# Patient Record
Sex: FEMALE
[De-identification: names, ages, dates, MRNs, and addresses within clinical notes are randomized per-mention and may not be internally consistent; named-entity substitution may affect disease eponyms.]

---

## 2020-01-14 ENCOUNTER — HOSPITAL ENCOUNTER (EMERGENCY)
Dept: HOSPITAL 97 - ER | Age: 65
Discharge: HOME | End: 2020-01-14
Payer: MEDICARE

## 2020-01-14 VITALS — DIASTOLIC BLOOD PRESSURE: 88 MMHG | OXYGEN SATURATION: 96 % | SYSTOLIC BLOOD PRESSURE: 130 MMHG

## 2020-01-14 VITALS — TEMPERATURE: 98 F

## 2020-01-14 DIAGNOSIS — E11.9: ICD-10-CM

## 2020-01-14 DIAGNOSIS — J44.9: ICD-10-CM

## 2020-01-14 DIAGNOSIS — L03.116: Primary | ICD-10-CM

## 2020-01-14 LAB
ALBUMIN SERPL BCP-MCNC: 3.1 G/DL (ref 3.4–5)
ALP SERPL-CCNC: 89 U/L (ref 45–117)
ALT SERPL W P-5'-P-CCNC: 17 U/L (ref 12–78)
AST SERPL W P-5'-P-CCNC: 13 U/L (ref 15–37)
BUN BLD-MCNC: 13 MG/DL (ref 7–18)
GLUCOSE SERPLBLD-MCNC: 156 MG/DL (ref 74–106)
HCT VFR BLD CALC: 39.1 % (ref 36–45)
LYMPHOCYTES # SPEC AUTO: 2.6 K/UL (ref 0.7–4.9)
PMV BLD: 8.6 FL (ref 7.6–11.3)
POTASSIUM SERPL-SCNC: 3.6 MMOL/L (ref 3.5–5.1)
RBC # BLD: 4.38 M/UL (ref 3.86–4.86)

## 2020-01-14 PROCEDURE — 36415 COLL VENOUS BLD VENIPUNCTURE: CPT

## 2020-01-14 PROCEDURE — 99284 EMERGENCY DEPT VISIT MOD MDM: CPT

## 2020-01-14 PROCEDURE — 96365 THER/PROPH/DIAG IV INF INIT: CPT

## 2020-01-14 PROCEDURE — 96366 THER/PROPH/DIAG IV INF ADDON: CPT

## 2020-01-14 PROCEDURE — 80076 HEPATIC FUNCTION PANEL: CPT

## 2020-01-14 PROCEDURE — 85025 COMPLETE CBC W/AUTO DIFF WBC: CPT

## 2020-01-14 PROCEDURE — 96375 TX/PRO/DX INJ NEW DRUG ADDON: CPT

## 2020-01-14 PROCEDURE — 80048 BASIC METABOLIC PNL TOTAL CA: CPT

## 2020-01-14 NOTE — EDPHYS
Physician Documentation                                                                           

 [facility]                                                                 

Name: Cherelle Chang                                                                                

Age: 64 yrs                                                                                       

Sex: Female                                                                                       

: 1955                                                                                   

MRN: [id number]                                                                                   

Arrival Date: 2020                                                                          

Time: 18:37                                                                                       

Account#: [id number]                                                                            

Bed 26                                                                                            

Private MD: None, None                                                                            

ED Physician Yaw Kingsley                                                                     

HPI:                                                                                              

                                                                                             

19:57 This 64 yrs old  Female presents to ER via Wheelchair with complaints of Leg    tw4 

      Pain.                                                                                       

19:57 The patient presents with pain, that is acute. The complaints affect the left hip, left tw4 

      upper thigh and left quadriceps. Context: The problem was sustained at home. Onset: The     

      symptoms/episode began/occurred 4 day(s) ago. Modifying factors: The symptoms are           

      alleviated by remaining still, the symptoms are aggravated by movement, weight bearing,     

      bending knee. Associated signs and symptoms: The patient has no apparent associated         

      signs or symptoms. Severity of symptoms: At their worst the symptoms were moderate, in      

      the emergency department the symptoms are unchanged. The patient has experienced a          

      previous episode.                                                                           

                                                                                                  

Historical:                                                                                       

- Allergies:                                                                                      

18:55 NKA;                                                                                    iw  

- Home Meds:                                                                                      

20:25 Albuterol Inhl [Active]; Atrovent Inhl [Active]; levofloxacin 750 mg Oral tab 1 tab     mg2 

      once daily [Active]; metformin 500 mg Oral Tb24 1 tab once daily [Active]; prednisone       

      20 mg Oral tab once daily [Active];                                                         

- PMHx:                                                                                           

18:55 COPD; Diabetes - NIDDM;                                                                 iw  

- PSHx:                                                                                           

18:55 Hysterectomy; ;                                                                iw  

                                                                                                  

- Immunization history:: Flu vaccine status is unknown.                                           

- Ebola Screening: : Patient negative for fever greater than or equal to 101.5 degrees            

  Fahrenheit, and additional compatible Ebola Virus Disease symptoms Patient denies               

  exposure to infectious person Patient denies travel to an Ebola-affected area in the            

  21 days before illness onset No symptoms or risks identified at this time.                      

- Social history:: Smoking status: Patient/guardian denies using tobacco.                         

                                                                                                  

                                                                                                  

ROS:                                                                                              

19:57 Eyes: Negative for injury, pain, redness, and discharge, ENT: Negative for injury,      tw4 

      pain, and discharge, Cardiovascular: Negative for chest pain, palpitations, and edema,      

      Respiratory: Negative for shortness of breath, cough, wheezing, and pleuritic chest         

      pain, Abdomen/GI: Negative for abdominal pain, nausea, vomiting, diarrhea, and              

      constipation, Back: Negative for injury and pain, Skin: Negative for injury, rash, and      

      discoloration, Neuro: Negative for headache, weakness, numbness, tingling, and seizure.     

19:57 MS/extremity: Positive for decreased range of motion, erythema, pain, swelling,             

      tenderness, tingling, Negative for injury or acute deformity, abrasion, bite,               

      contusion, deformity, ecchymosis, laceration, puncture.                                     

                                                                                                  

Exam:                                                                                             

19:57 Constitutional:  This is a well developed, well nourished patient who is awake, alert,  tw4 

      and in no acute distress. Head/Face:  Normocephalic, atraumatic. Chest/axilla:  Normal      

      chest wall appearance and motion.  Nontender with no deformity.  No lesions are             

      appreciated. Cardiovascular:  Regular rate and rhythm with a normal S1 and S2.  No          

      gallops, murmurs, or rubs.  Normal PMI, no JVD.  No pulse deficits. Respiratory:  Lungs     

      have equal breath sounds bilaterally, clear to auscultation and percussion.  No rales,      

      rhonchi or wheezes noted.  No increased work of breathing, no retractions or nasal          

      flaring. Abdomen/GI:  Soft, non-tender, with normal bowel sounds.  No distension or         

      tympany.  No guarding or rebound.  No evidence of tenderness throughout. Back:  No          

      spinal tenderness.  No costovertebral tenderness.  Full range of motion. Skin:  Warm,       

      dry with normal turgor.  Normal color with no rashes, no lesions, and no evidence of        

      cellulitis.                                                                                 

19:57 Musculoskeletal/extremity: Extremities: noted in the left hip, left upper thigh and         

      left quadriceps: decreased ROM, erythema, pain, swelling, tenderness.                       

                                                                                                  

Vital Signs:                                                                                      

19:00  / 76; Pulse 102; Resp 18; Temp 98; Pulse Ox 100% on R/A;                         mg2 

22:14  / 88; Pulse 88; Resp 18; Temp 98; Pulse Ox 96% on R/A;                           mg2 

                                                                                                  

MDM:                                                                                              

19:14 Patient medically screened.                                                                                                                                                          

19:37 Order name: Basic Metabolic Panel; Complete Time: 22:16                                                                                                                              

22:16 Interpretation: Normal except: GFR 85; GLUC 156; .                                                                                                                             

19:37 Order name: CBC with Diff; Complete Time: 22:16                                                                                                                                      

22:16 Interpretation: Normal except: WBC 11.3.                                                                                                                                             

19:37 Order name: Creatinine for Radiology; Complete Time: 22:16                              4 

                                                                                             

22:16 Interpretation: Within normal limits: CRE 0.71.                                                                                                                                      

19:37 Order name: Hepatic Function; Complete Time: 22:16                                      4 

                                                                                             

22:16 Interpretation: Normal except: AST 13; ALB 3.1; A/G 0.9.                                                                                                                             

19:37 Order name: IV Saline Lock; Complete Time: 19:55                                        4 

                                                                                             

19:37 Order name: Labs collected and sent; Complete Time: 19:55                               tw4 

                                                                                                  

Administered Medications:                                                                         

19:52 Drug: TORadol 30 mg Route: IVP; Site: right forearm;                                    mg2 

22:18 Follow up: Response: No adverse reaction; Marked relief of symptoms                     mg2 

19:55 Drug: Cleocin 300 mg Route: IVPB; Infused Over: 30 mins; Site: right forearm;           mg2 

22:18 Follow up: Response: No adverse reaction; IV Status: Completed infusion                 mg2 

                                                                                                  

                                                                                                  

Disposition:                                                                                      

20 22:17 Discharged to Home. Impression: Cellulitis of left lower limb.                     

- Condition is Stable.                                                                            

- Discharge Instructions: Cellulitis, Adult.                                                      

- Prescriptions for Cleocin 300 mg Oral Capsule - take 1 capsule by ORAL route every 6            

  hours for 10 days; 40 capsule. Ibuprofen 800 mg Oral Tablet - take 1 tablet by ORAL             

  route every 8 hours As needed take with food; 30 tablet.                                        

- Medication Reconciliation Form, Thank You Letter, Antibiotic Education, Prescription            

  Opioid Use form.                                                                                

- Follow up: Private Physician; When: Upon discharge from the Emergency Department;               

  Reason: Recheck today's complaints, Continuance of care.                                        

- Problem is new.                                                                                 

- Symptoms have improved.                                                                         

                                                                                                  

                                                                                                  

                                                                                                  

Signatures:                                                                                       

Dispatcher [employer]                           EDKarlie Cutler RN                     RN   iw                                                   

Yaw Kingsley MD MD   tw4                                                  

Josiah Finn RN                    RN   mg2                                                  

                                                                                                  

Corrections: (The following items were deleted from the chart)                                    

22:26 22:17 2020 22:17 Discharged to Home. Impression: Cellulitis of left lower limb.   mg2 

      Condition is Stable. Forms are Medication Reconciliation Form, Thank You Letter,            

      Antibiotic Education, Prescription Opioid Use. Follow up: Private Physician; When: Upon     

      discharge from the Emergency Department; Reason: Recheck today's complaints,                

      Continuance of care. Problem is new. Symptoms have improved. tw4                            

                                                                                                  

**************************************************************************************************

## 2020-01-14 NOTE — ER
Nurse's Notes                                                                                     

 [facility]                                                                 

Name: Cherelle Chang                                                                                

Age: 64 yrs                                                                                       

Sex: Female                                                                                       

: 1955                                                                                   

MRN: [id number]                                                                                   

Arrival Date: 2020                                                                          

Time: 18:37                                                                                       

Account#: [id number]                                                                            

Bed 26                                                                                            

Private MD: None, None                                                                            

Diagnosis: Cellulitis of left lower limb                                                          

                                                                                                  

Presentation:                                                                                     

                                                                                             

18:53 Presenting complaint: Patient states: has had pain and swelling to left hip X 4 days,   iw  

      denies injury to area, also feels like her left buttock is swollen. Transition of care:     

      patient was not received from another setting of care. Onset of symptoms was 2020. Risk Assessment: Do you want to hurt yourself or someone else? Patient            

      reports no desire to harm self or others. Initial Sepsis Screen: Does the patient meet      

      any 2 criteria? No. Patient's initial sepsis screen is negative. Does the patient have      

      a suspected source of infection? No. Patient's initial sepsis screen is negative. Care      

      prior to arrival: None.                                                                     

18:53 Method Of Arrival: Wheelchair                                                           iw  

18:53 Acuity: SHON 3                                                                           iw  

                                                                                                  

Historical:                                                                                       

- Allergies:                                                                                      

18:55 NKA;                                                                                    iw  

- Home Meds:                                                                                      

20:25 Albuterol Inhl [Active]; Atrovent Inhl [Active]; levofloxacin 750 mg Oral tab 1 tab     mg2 

      once daily [Active]; metformin 500 mg Oral Tb24 1 tab once daily [Active]; prednisone       

      20 mg Oral tab once daily [Active];                                                         

- PMHx:                                                                                           

18:55 COPD; Diabetes - NIDDM;                                                                 iw  

- PSHx:                                                                                           

18:55 Hysterectomy; ;                                                                iw  

                                                                                                  

- Immunization history:: Flu vaccine status is unknown.                                           

- Ebola Screening: : Patient negative for fever greater than or equal to 101.5 degrees            

  Fahrenheit, and additional compatible Ebola Virus Disease symptoms Patient denies               

  exposure to infectious person Patient denies travel to an Ebola-affected area in the            

  21 days before illness onset No symptoms or risks identified at this time.                      

- Social history:: Smoking status: Patient/guardian denies using tobacco.                         

                                                                                                  

                                                                                                  

Screenin:08 Abuse screen: Denies threats or abuse. Denies injuries from another. Nutritional        mg2 

      screening: No deficits noted. Tuberculosis screening: No symptoms or risk factors           

      identified.                                                                                 

20:25 Fall Risk IV access (20 points).                                                        mg2 

                                                                                                  

Assessment:                                                                                       

20:18 General: Appears in no apparent distress. comfortable, Behavior is calm, cooperative.   mg2 

      Pain: Complains of pain in left upper thigh and left hip Pain does not radiate. Quality     

      of pain is described as aching. Neuro: Level of Consciousness is awake, alert, obeys        

      commands, Oriented to person, place, time, situation. Cardiovascular: Capillary refill      

      < 3 seconds Patient's skin is warm and dry. Respiratory: Airway is patent Respiratory       

      effort is even, unlabored, Respiratory pattern is regular, symmetrical. GI: No signs        

      and/or symptoms were reported involving the gastrointestinal system. : No signs           

      and/or symptoms were reported regarding the genitourinary system. EENT: No signs and/or     

      symptoms were reported regarding the EENT system. Derm: Skin is intact, is healthy with     

      good turgor, Skin is pink, warm \T\ dry. normal. Derm: Skin is redness in the left thigh.   

22:14 Reassessment: Patient appears in no apparent distress at this time. Patient and/or      mg2 

      family updated on plan of care and expected duration. Pain level reassessed. Patient is     

      alert, oriented x 3, equal unlabored respirations, skin warm/dry/pink. Patient denies       

      pain at this time. Patient states feeling better.                                           

                                                                                                  

Vital Signs:                                                                                      

19:00  / 76; Pulse 102; Resp 18; Temp 98; Pulse Ox 100% on R/A;                         mg2 

22:14  / 88; Pulse 88; Resp 18; Temp 98; Pulse Ox 96% on R/A;                           mg2 

                                                                                                  

ED Course:                                                                                        

18:37 Patient arrived in ED.                                                                  as  

18:38 None, None is Private Physician.                                                        as  

18:50 Josiah Finn, ANUJA is Primary Nurse.                                                  mg2 

18:54 Triage completed.                                                                       iw  

19:08 Arm band placed on.                                                                     mg2 

19:14 Yaw Kingsley MD is Attending Physician.                                            tw4 

19:45 Inserted saline lock: 20 gauge in right antecubital area, using aseptic technique.      mg2 

      Blood collected.                                                                            

20:23 No provider procedures requiring assistance completed.                                  mg2 

20:24 Patient has correct armband on for positive identification.                             mg2 

22:25 IV discontinued, intact, bleeding controlled, No redness/swelling at site. Pressure     mg2 

      dressing applied.                                                                           

                                                                                                  

Administered Medications:                                                                         

19:52 Drug: TORadol 30 mg Route: IVP; Site: right forearm;                                    mg2 

22:18 Follow up: Response: No adverse reaction; Marked relief of symptoms                     mg2 

19:55 Drug: Cleocin 300 mg Route: IVPB; Infused Over: 30 mins; Site: right forearm;           mg2 

22:18 Follow up: Response: No adverse reaction; IV Status: Completed infusion                 mg2 

                                                                                                  

                                                                                                  

Outcome:                                                                                          

22:17 Discharge ordered by MD.                                                                beatriz 

22:25 Discharged to home ambulatory.                                                          mg2 

22:25 Condition: good                                                                             

22:25 Discharge instructions given to patient, Instructed on discharge instructions, follow       

      up and referral plans. medication usage, Demonstrated understanding of instructions,        

      follow-up care, medications, Prescriptions given X 2.                                       

22:26 Patient left the ED.                                                                    mg2 

                                                                                                  

Signatures:                                                                                       

Yasmin Duenas Irene, RN                     RN   iw                                                   

Yaw Kingsley MD MD   tw4                                                  

Josiah Finn RN                    RN   mg2                                                  

                                                                                                  

**************************************************************************************************

## 2020-01-14 NOTE — XMS REPORT
Patient Summary Document

 Created on:2020



Patient:KISHOR PEACOCK

Sex:Female

:1955

External Reference #:[id number]





Demographics







 Address  [address] [place] [address] [address]



   [place] [address]

 

 Home Phone  [phone]

 

 Email Address  NONE

 

 Preferred Language  Unknown

 

 Marital Status  Unknown

 

 An[employer]ism Affiliation  Unknown

 

 Race  Unknown

 

 Additional Race(s)  Unavailable

 

 Ethnic Group  Unknown









Author







 Organization  [facility]

 

 Address  [address] [place] [address] [address]. Mccoy [address] [place] [id number]

 

 Phone  [phone]









Care Team Providers







 Name  Role  Phone

 

 Unavailable  Unavailable  Unavailable









Problems

This patient has no known problems.



Allergies, Adverse Reactions, Alerts

This patient has no known allergies or adverse reactions.



Medications

This patient has no known medications.

## 2020-02-23 ENCOUNTER — HOSPITAL ENCOUNTER (EMERGENCY)
Dept: HOSPITAL 97 - ER | Age: 65
Discharge: HOME | End: 2020-02-23
Payer: MEDICARE

## 2020-02-23 VITALS — TEMPERATURE: 98.3 F | DIASTOLIC BLOOD PRESSURE: 87 MMHG | SYSTOLIC BLOOD PRESSURE: 143 MMHG | OXYGEN SATURATION: 96 %

## 2020-02-23 DIAGNOSIS — J15.8: Primary | ICD-10-CM

## 2020-02-23 DIAGNOSIS — J44.9: ICD-10-CM

## 2020-02-23 DIAGNOSIS — E11.9: ICD-10-CM

## 2020-02-23 DIAGNOSIS — F17.210: ICD-10-CM

## 2020-02-23 LAB
ALBUMIN SERPL BCP-MCNC: 3.4 G/DL (ref 3.4–5)
ALP SERPL-CCNC: 101 U/L (ref 45–117)
ALT SERPL W P-5'-P-CCNC: 29 U/L (ref 12–78)
AST SERPL W P-5'-P-CCNC: 21 U/L (ref 15–37)
BUN BLD-MCNC: 9 MG/DL (ref 7–18)
GLUCOSE SERPLBLD-MCNC: 110 MG/DL (ref 74–106)
HCT VFR BLD CALC: 41.1 % (ref 36–45)
INR BLD: 0.85
LYMPHOCYTES # SPEC AUTO: 2.1 K/UL (ref 0.7–4.9)
MAGNESIUM SERPL-MCNC: 2.4 MG/DL (ref 1.8–2.4)
NT-PROBNP SERPL-MCNC: 31 PG/ML (ref ?–125)
PMV BLD: 8.5 FL (ref 7.6–11.3)
POTASSIUM SERPL-SCNC: 3.8 MMOL/L (ref 3.5–5.1)
RBC # BLD: 4.74 M/UL (ref 3.86–4.86)
TROPONIN (EMERG DEPT USE ONLY): < 0.02 NG/ML (ref 0–0.04)

## 2020-02-23 PROCEDURE — 80076 HEPATIC FUNCTION PANEL: CPT

## 2020-02-23 PROCEDURE — 71045 X-RAY EXAM CHEST 1 VIEW: CPT

## 2020-02-23 PROCEDURE — 80048 BASIC METABOLIC PNL TOTAL CA: CPT

## 2020-02-23 PROCEDURE — 85025 COMPLETE CBC W/AUTO DIFF WBC: CPT

## 2020-02-23 PROCEDURE — 36415 COLL VENOUS BLD VENIPUNCTURE: CPT

## 2020-02-23 PROCEDURE — 83880 ASSAY OF NATRIURETIC PEPTIDE: CPT

## 2020-02-23 PROCEDURE — 83735 ASSAY OF MAGNESIUM: CPT

## 2020-02-23 PROCEDURE — 85610 PROTHROMBIN TIME: CPT

## 2020-02-23 PROCEDURE — 93005 ELECTROCARDIOGRAM TRACING: CPT

## 2020-02-23 PROCEDURE — 99285 EMERGENCY DEPT VISIT HI MDM: CPT

## 2020-02-23 PROCEDURE — 84484 ASSAY OF TROPONIN QUANT: CPT

## 2020-02-23 NOTE — EDPHYS
Physician Documentation                                                                           

 [facility]                                                                 

Name: Cherelle Chang                                                                                

Age: 64 yrs                                                                                       

Sex: Female                                                                                       

: 1955                                                                                   

MRN: [id number]                                                                                   

Arrival Date: 2020                                                                          

Time: 10:30                                                                                       

Account#: [id number]                                                                            

Bed 4                                                                                             

Private MD:                                                                                       

ED Physician Karan Iverson                                                                         

HPI:                                                                                              

                                                                                             

11:01 This 64 yrs old  Female presents to ER via Ambulatory with complaints of Chest  jr8 

      Pain, Arm Pain.                                                                             

11:01 The patient or guardian reports chest pain that is located primarily in the anterior    jr8 

      chest wall, left. Onset: gradually, 2 day(s) ago. The pain radiates to the left arm.        

      Associated signs and symptoms: Pertinent positives: cough, palpitations, shortness of       

      breath. The chest pain is described as a pressure. Duration: The patient or guardian        

      reports multiple episodes, that are intermittent. Modifying factors: The symptoms are       

      alleviated by nothing. the symptoms are aggravated by nothing. Severity of pain: At its     

      worst the pain was mild in the emergency department the pain is unchanged. The patient      

      has not experienced similar symptoms in the past. The patient has not recently seen a       

      physician.                                                                                  

                                                                                                  

Historical:                                                                                       

- Allergies:                                                                                      

10:48 NKA;                                                                                    ph  

- Home Meds:                                                                                      

10:48 Albuterol Inhl [Active]; metformin 500 mg Oral Tb24 1 tab once daily [Active];          ph  

- PMHx:                                                                                           

10:48 COPD; Diabetes - NIDDM;                                                                 ph  

- PSHx:                                                                                           

10:48 Hysterectomy; ;                                                                ph  

                                                                                                  

- Immunization history:: Flu vaccine is not up to date.                                           

- Coronavirus screen:: The patient has NOT traveled to [place] in the past 14 days. The             

  patient has NOT had contact with known/suspected case of Coronavirus?.                          

- Social history:: Smoking status: Patient reports the use of cigarette tobacco                   

  products, smokes one-half pack cigarettes per day.                                              

- Ebola Screening: : No symptoms or risks identified at this time.                                

                                                                                                  

                                                                                                  

ROS:                                                                                              

11:01 Eyes: Negative for injury, pain, redness, and discharge, ENT: Negative for injury,      jr8 

      pain, and discharge, Neck: Negative for injury, pain, and swelling, Abdomen/GI:             

      Negative for abdominal pain, nausea, vomiting, diarrhea, and constipation, Back:            

      Negative for injury and pain, MS/Extremity: Negative for injury and deformity, Skin:        

      Negative for injury, rash, and discoloration, Neuro: Negative for headache, weakness,       

      numbness, tingling, and seizure.                                                            

11:01 Cardiovascular: Positive for chest pain, palpitations.                                      

11:01 Respiratory: Positive for cough, shortness of breath, wheezing.                             

                                                                                                  

Exam:                                                                                             

11:01 Eyes:  Pupils equal round and reactive to light, extra-ocular motions intact.  Lids and jr8 

      lashes normal.  Conjunctiva and sclera are non-icteric and not injected.  Cornea within     

      normal limits.  Periorbital areas with no swelling, redness, or edema. ENT:  Nares          

      patent. No nasal discharge, no septal abnormalities noted.  Tympanic membranes are          

      normal and external auditory canals are clear.  Oropharynx with no redness, swelling,       

      or masses, exudates, or evidence of obstruction, uvula midline.  Mucous membranes           

      moist. Neck:  Trachea midline, no thyromegaly or masses palpated, and no cervical           

      lymphadenopathy.  Supple, full range of motion without nuchal rigidity, or vertebral        

      point tenderness.  No Meningismus. Chest/axilla:  Normal chest wall appearance and          

      motion.  Nontender with no deformity.  No lesions are appreciated. Cardiovascular:          

      Regular rate and rhythm with a normal S1 and S2.  No gallops, murmurs, or rubs.  Normal     

      PMI, no JVD.  No pulse deficits. Respiratory:  Lungs have equal breath sounds               

      bilaterally, clear to auscultation and percussion.  No rales, rhonchi or wheezes noted.     

       No increased work of breathing, no retractions or nasal flaring. Abdomen/GI:  Soft,        

      non-tender, with normal bowel sounds.  No distension or tympany.  No guarding or            

      rebound.  No evidence of tenderness throughout. Back:  No spinal tenderness.  No            

      costovertebral tenderness.  Full range of motion. Skin:  Warm, dry with normal turgor.      

      Normal color with no rashes, no lesions, and no evidence of cellulitis. MS/ Extremity:      

      Pulses equal, no cyanosis.  Neurovascular intact.  Full, normal range of motion. Neuro:     

       Awake and alert, GCS 15, oriented to person, place, time, and situation.  Cranial          

      nerves II-XII grossly intact.  Motor strength 5/5 in all extremities.  Sensory grossly      

      intact.  Cerebellar exam normal.  Normal gait.                                              

11:02 ECG was reviewed by the Attending Physician.                                            [facility] 

                                                                                                  

Vital Signs:                                                                                      

10:46  / 87; Pulse 92; Resp 18; Temp 98.3; Pulse Ox 96% on R/A; Weight 62.14 kg; Height ph  

      5 ft. 3 in. (160.02 cm);                                                                    

12:00  / 78; Pulse 81; Resp 18; Pulse Ox 97% on R/A;                                    ph  

13:00  / 72; Pulse 84; Resp 18; Temp 97.9; Pulse Ox 98% on R/A;                         ph  

10:46 Body Mass Index 24.27 (62.14 kg, 160.02 cm)                                             ph  

                                                                                                  

MDM:                                                                                              

10:40 Patient medically screened.                                                             8 

12:34 Differential diagnosis: acute myocardial infarction, acute pericarditis, anxiety, chest jr8 

      wall pain, cholecystitis, Cholelithiasis costochondritis, esophagitis, gastritis,           

      gastroesophageal reflux disease (GERD), pancreatitis, peptic ulcer disease, pleurisy,       

      pneumonia, pulmonary embolus, stable angina, thoracic aortic disection, unstable            

      angina. Data reviewed: vital signs, nurses notes, lab test result(s), EKG, radiologic       

      studies, plain films. Data interpreted: Pulse oximetry: on room air is 96 %.                

      Interpretation: normal. Counseling: I had a detailed discussion with the patient and/or     

      guardian regarding: the historical points, exam findings, and any diagnostic results        

      supporting the discharge/admit diagnosis, lab results, radiology results, the need for      

      outpatient follow up, a family practitioner, to return to the emergency department if       

      symptoms worsen or persist or if there are any questions or concerns that arise at          

      home. ED course: Discussed with patient that her cough and phlegm production along with     

      chest pain is most likely pneumonia with pleurisy. Will be on Abx. That she needs to        

      f/u with PCP to insure that pneumonia and lymphadenopathy go away. Otherwise would not      

      CT scan to further explore the lymphadenopathy .                                            

                                                                                                  

                                                                                             

10:40 Order name: Basic Metabolic Panel; Complete Time: 12:                                                                                             

10:40 Order name: CBC with Diff; Complete Time: 12:11                                                                                                                                      

10:40 Order name: LFT's; Complete Time: :                                                                                             

10:40 Order name: Magnesium; Complete Time: 12:                                                                                             

10:40 Order name: NT PRO-BNP; Complete Time: 12:                                                                                             

10:40 Order name: PT-INR; Complete Time: 12:                                                                                             

10:40 Order name: Troponin (emerg Dept Use Only); Complete Time: 12:31                                                                                                                     

10:40 Order name: XRAY Chest (1 view); Complete Time: 11:24                                                                                                                                

10:40 Order name: EKG; Complete Time: 10:42                                                                                                                                                

10:40 Order name: Cardiac monitoring; Complete Time: 10:53                                    jr8 

02/23                                                                                             

10:40 Order name: EKG - Nurse/Tech; Complete Time: 10:53                                      jr8 

02/23                                                                                             

10:40 Order name: IV Saline Lock; Complete Time: 12:                                                                                             

10:40 Order name: Labs collected and sent; Complete Time: :                                                                                             

10:40 Order name: O2 Per Protocol; Complete Time: 10:53                                       jr8 

02/23                                                                                             

10:40 Order name: O2 Sat Monitoring; Complete Time: 10:53                                     jr8 

                                                                                                  

EC:02 Rate is 87 beats/min. Rhythm is regular, Normal Sinus Rhythm. QRS Axis is Normal. MN    jr8 

      interval is normal at 144 msec. QRS interval is normal at 78 msec. QT interval is           

      normal at 440 msec. No Q waves. T waves are Normal. No ST changes noted. Clinical           

      impression: Normal ECG and No evidence of ischemia. Interpreted by me. Reviewed by me.      

                                                                                                  

Administered Medications:                                                                         

No medications were administered                                                                  

                                                                                                  

                                                                                                  

Disposition:                                                                                      

15:14 Co-signature as Attending Physician, Karan Iverson MD.                                    rn  

                                                                                                  

Disposition:                                                                                      

20 12:36 Discharged to Home. Impression: Pneumonia due to other specified bacteria,         

  Pleurisy.                                                                                       

- Condition is Stable.                                                                            

- Discharge Instructions: Community-Acquired Pneumonia, Adult.                                    

- Prescriptions for Zithromax Z- Carlos 250 mg Oral Tablet - take 1 tablet by ORAL route             

  as directed for 5 days Day 1 - take two (2) tablets one time. Day 2, 3, 4 , 5 take              

  one (1) tablet once daily.; 6 tablet. Medrol (Carlos) 4 mg Oral Tablets, Dose Pack -               

  take 1 tablet by ORAL route as directed - follow package instructions; 1 packet.                

  Albuterol Sulfate 90 mcg/actuation - inhale 1-2 puff by INHALATION route every 4-6              

  hours; 1 Inhaler. Albuterol Sulfate 2.5 mg /3 mL (0.083 %) Inhalation Solution for              

  Nebulization - inhale 1 unit by NEBULIZATION route every 8 hours As needed; 1 box.              

  Metformin 500 mg Oral Tablet Sustained Release 24 hr - take 1 tablet by ORAL route              

  once daily with evening meal; 20 tablet.                                                        

- Medication Reconciliation Form, Thank You Letter, Antibiotic Education, Prescription            

  Opioid Use form.                                                                                

- Follow up: Private Physician; When: 5 - 6 days; Reason: Recheck today's complaints,             

  Continuance of care, Re-evaluation by your physician.                                           

- Problem is new.                                                                                 

- Symptoms have improved.                                                                         

                                                                                                  

                                                                                                  

                                                                                                  

Signatures:                                                                                       

Dispatcher [employer]                           EDMS                                                 

Karan Iverson MD MD   rn                                                   

Grant Angulo PA                        PA   jr8                                                  

Kelli Key RN                      RN   ph                                                   

                                                                                                  

Corrections: (The following items were deleted from the chart)                                    

13:08 12:36 2020 12:36 Discharged to Home. Impression: Pneumonia due to other specified ph  

      bacteria; Pleurisy. Condition is Stable. Forms are Medication Reconciliation Form,          

      Thank You Letter, Antibiotic Education, Prescription Opioid Use. Follow up: Private         

      Physician; When: 5 - 6 days; Reason: Recheck today's complaints, Continuance of care,       

      Re-evaluation by your physician. Problem is new. Symptoms have improved. jr8                

                                                                                                  

**************************************************************************************************

## 2020-02-23 NOTE — XMS REPORT
Patient Summary Document

 Created on:2020



Patient:KISHOR PEACOCK

Sex:Female

:1955

External Reference #:[id number]





Demographics







 Address  [address]



   [place] [address]

 

 Home Phone  [phone]

 

 Preferred Language  Unknown

 

 Marital Status  Unknown

 

 Catholic Affiliation  Unknown

 

 Race  Unknown

 

 Additional Race(s)  Unavailable

 

 Ethnic Group  Unknown









Author







 Organization  [facility]

 

 Address  [address] [place] [id number]

 

 Phone  [phone]









Care Team Providers







 Name  Role  Phone

 

 Unavailable  Unavailable  Unavailable









Problems

This patient has no known problems.



Allergies, Adverse Reactions, Alerts

This patient has no known allergies or adverse reactions.



Medications

This patient has no known medications.

## 2020-02-23 NOTE — ER
Nurse's Notes                                                                                     

 [facility]                                                                 

Name: Cherelle Chang                                                                                

Age: 64 yrs                                                                                       

Sex: Female                                                                                       

: 1955                                                                                   

MRN: [id number]                                                                                   

Arrival Date: 2020                                                                          

Time: 10:30                                                                                       

Account#: [id number]                                                                            

Bed 4                                                                                             

Private MD:                                                                                       

Diagnosis: Pneumonia due to other specified bacteria;Pleurisy                                     

                                                                                                  

Presentation:                                                                                     

                                                                                             

10:45 Presenting complaint: Patient states: Chest pressure, SOB, palpations, and chest        ph  

      congestion, denies fever, N/V/D. Transition of care: patient was not received from          

      another setting of care. Onset of symptoms was 2020. Risk Assessment: Do       

      you want to hurt yourself or someone else? Patient reports no desire to harm self or        

      others. Initial Sepsis Screen: Does the patient meet any 2 criteria? No. Patient's          

      initial sepsis screen is negative. Does the patient have a suspected source of              

      infection? No. Patient's initial sepsis screen is negative. Care prior to arrival: None.    

10:45 Method Of Arrival: Ambulatory                                                           ph  

10:45 Acuity: SHON 3                                                                           ph  

                                                                                                  

Historical:                                                                                       

- Allergies:                                                                                      

10:48 NKA;                                                                                    ph  

- Home Meds:                                                                                      

10:48 Albuterol Inhl [Active]; metformin 500 mg Oral Tb24 1 tab once daily [Active];          ph  

- PMHx:                                                                                           

10:48 COPD; Diabetes - NIDDM;                                                                 ph  

- PSHx:                                                                                           

10:48 Hysterectomy; ;                                                                ph  

                                                                                                  

- Immunization history:: Flu vaccine is not up to date.                                           

- Coronavirus screen:: The patient has NOT traveled to [place] in the past 14 days. The             

  patient has NOT had contact with known/suspected case of Coronavirus?.                          

- Social history:: Smoking status: Patient reports the use of cigarette tobacco                   

  products, smokes one-half pack cigarettes per day.                                              

- Ebola Screening: : No symptoms or risks identified at this time.                                

                                                                                                  

                                                                                                  

Screening:                                                                                        

10:51 Abuse screen: Denies threats or abuse. Denies injuries from another. Nutritional        ph  

      screening: No deficits noted. Tuberculosis screening: No symptoms or risk factors           

      identified. Fall Risk None identified.                                                      

                                                                                                  

Assessment:                                                                                       

10:48 General: Appears in no apparent distress. comfortable, well groomed, Behavior is calm,  ph  

      cooperative, appropriate for age, Denies fever. Pain: Complains of pain in anterior         

      aspect of left upper chest and left breast Pain radiates to left arm Quality of pain is     

      described as heavy, pressure, Pain began 2-3 days ago. Neuro: Level of Consciousness is     

      awake, alert, obeys commands, Oriented to person, place, time, situation.                   

      Cardiovascular: Reports chest pain, palpitations, shortness of breath, Denies               

      lightheadedness, syncope, vomiting, Capillary refill < 3 seconds in bilateral fingers       

      Patient's skin is warm and dry. Rhythm is sinus rhythm. Respiratory: Reports shortness      

      of breath on exertion Airway is patent Respiratory effort is even, unlabored,               

      Respiratory pattern is regular, symmetrical. GI: No signs and/or symptoms were reported     

      involving the gastrointestinal system. Derm: Skin is intact, is healthy with good           

      turgor, Skin is pink, warm \T\ dry. Musculoskeletal: Circulation, motion, and sensation     

      intact. Range of motion: intact in all extremities.                                         

12:00 Reassessment: Patient appears in no apparent distress at this time. Patient and/or      ph  

      family updated on plan of care and expected duration. Pain level reassessed. Patient is     

      alert, oriented x 3, equal unlabored respirations, skin warm/dry/pink.                      

                                                                                                  

Vital Signs:                                                                                      

10:46  / 87; Pulse 92; Resp 18; Temp 98.3; Pulse Ox 96% on R/A; Weight 62.14 kg; Height ph  

      5 ft. 3 in. (160.02 cm);                                                                    

12:00  / 78; Pulse 81; Resp 18; Pulse Ox 97% on R/A;                                    ph  

13:00  / 72; Pulse 84; Resp 18; Temp 97.9; Pulse Ox 98% on R/A;                         ph  

10:46 Body Mass Index 24.27 (62.14 kg, 160.02 cm)                                             ph  

                                                                                                  

ED Course:                                                                                        

10:30 Patient arrived in ED.                                                                  as  

10:35 Grant Angulo PA is PHCP.                                                               [id number] 

10:35 Karan Iverson MD is Attending Physician.                                                [id number] 

10:44 Kelli Key RN is Primary Nurse.                                                    ph  

10:46 Triage completed.                                                                       ph  

10:48 Arm band placed on Patient placed in an exam room, on a stretcher, on cardiac monitor,  ph  

      on pulse oximetry.                                                                          

10:51 Patient has correct armband on for positive identification. Placed in gown. Bed in low  ph  

      position. Call light in reach. Side rails up X 1. Cardiac monitor on. Pulse ox on. NIBP     

      on. Door closed. Noise minimized. Warm blanket given.                                       

10:52 Patient maintains SpO2 saturation greater than 95% on room air.                         ph  

11:00 EKG done, by ED staff, reviewed by Grant LORENZANA.                                      [email or web address] 

11:10 XRAY Chest (1 view) In Process Unspecified.                                             EDMS

13:08 No provider procedures requiring assistance completed. IV discontinued, intact,         ph  

      bleeding controlled, No redness/swelling at site. Pressure dressing applied.                

                                                                                                  

Administered Medications:                                                                         

No medications were administered                                                                  

                                                                                                  

                                                                                                  

Outcome:                                                                                          

12:36 Discharge ordered by MD. cao 

13:08 Patient left the ED.                                                                    ph  

13:08 Discharged to home ambulatory.                                                          ph  

13:08 Condition: good                                                                             

13:08 Discharge instructions given to patient, Instructed on discharge instructions, follow       

      up and referral plans. medication usage, Demonstrated understanding of instructions,        

      follow-up care, medications, Prescriptions given X 5                                        

                                                                                                  

Signatures:                                                                                       

Dispatcher [employer]                           EDMS                                                 

Vj Arana                                 jb1                                                  

Yasmin Duenas Josh, PA PA   [id number]                                                  

Kelli Key, RN                      RN                                                      

                                                                                                  

**************************************************************************************************

## 2020-02-23 NOTE — RAD REPORT
EXAM DESCRIPTION:  Arianna Single View2/23/2020 11:05 am

 

CLINICAL HISTORY:  Chest pain

 

COMPARISON:  November 2019

 

FINDINGS:   Left base is mildly hazy

 

The remainder of the lungs appear clear of acute infiltrate.

 

Mild mediastinal lymphadenopathy is suspected.

 

The heart is normal size

 

IMPRESSION:  Left base is mildly hazy which may indicate pneumonia or pneumonitis

 

Mild mediastinal lymphadenopathy suspected

## 2020-02-24 NOTE — EKG
Test Date:    2020-02-23               Test Time:    10:46:22

Technician:   SEVERO                                    

                                                     

MEASUREMENT RESULTS:                                       

Intervals:                                           

Rate:         87                                     

GA:           144                                    

QRSD:         78                                     

QT:           366                                    

QTc:          440                                    

Axis:                                                

P:            35                                     

GA:           144                                    

QRS:          60                                     

T:            44                                     

                                                     

INTERPRETIVE STATEMENTS:                                       

                                                     

Normal sinus rhythm

Normal ECG

Compared to ECG 11/28/2019 22:40:42

Sinus tachycardia no longer present



Electronically Signed On 02-24-20 08:54:40 CST by Chad Coy

## 2022-08-30 ENCOUNTER — HOSPITAL ENCOUNTER (OUTPATIENT)
Dept: HOSPITAL 97 - ER | Age: 67
Setting detail: OBSERVATION
Discharge: HOME | End: 2022-08-30
Attending: STUDENT IN AN ORGANIZED HEALTH CARE EDUCATION/TRAINING PROGRAM | Admitting: STUDENT IN AN ORGANIZED HEALTH CARE EDUCATION/TRAINING PROGRAM
Payer: COMMERCIAL

## 2022-08-30 ENCOUNTER — HOSPITAL ENCOUNTER (EMERGENCY)
Dept: HOSPITAL 97 - ER | Age: 67
Discharge: LEFT BEFORE DISCHARGE | End: 2022-08-30
Payer: COMMERCIAL

## 2022-08-30 VITALS — TEMPERATURE: 98.6 F

## 2022-08-30 VITALS — DIASTOLIC BLOOD PRESSURE: 82 MMHG | OXYGEN SATURATION: 90 % | SYSTOLIC BLOOD PRESSURE: 182 MMHG

## 2022-08-30 VITALS — BODY MASS INDEX: 25.1 KG/M2

## 2022-08-30 DIAGNOSIS — E04.1: ICD-10-CM

## 2022-08-30 DIAGNOSIS — Z79.899: ICD-10-CM

## 2022-08-30 DIAGNOSIS — Z79.84: ICD-10-CM

## 2022-08-30 DIAGNOSIS — Z79.52: ICD-10-CM

## 2022-08-30 DIAGNOSIS — I10: ICD-10-CM

## 2022-08-30 DIAGNOSIS — F41.9: ICD-10-CM

## 2022-08-30 DIAGNOSIS — Z80.49: ICD-10-CM

## 2022-08-30 DIAGNOSIS — F17.210: ICD-10-CM

## 2022-08-30 DIAGNOSIS — E87.6: ICD-10-CM

## 2022-08-30 DIAGNOSIS — E78.5: ICD-10-CM

## 2022-08-30 DIAGNOSIS — J44.1: Primary | ICD-10-CM

## 2022-08-30 DIAGNOSIS — Z28.310: ICD-10-CM

## 2022-08-30 DIAGNOSIS — E11.65: ICD-10-CM

## 2022-08-30 DIAGNOSIS — R09.02: ICD-10-CM

## 2022-08-30 DIAGNOSIS — Z90.710: ICD-10-CM

## 2022-08-30 DIAGNOSIS — Z82.49: ICD-10-CM

## 2022-08-30 DIAGNOSIS — Z82.5: ICD-10-CM

## 2022-08-30 DIAGNOSIS — Z20.822: ICD-10-CM

## 2022-08-30 DIAGNOSIS — Z02.9: Primary | ICD-10-CM

## 2022-08-30 DIAGNOSIS — Z83.3: ICD-10-CM

## 2022-08-30 DIAGNOSIS — Z71.6: ICD-10-CM

## 2022-08-30 LAB
ALBUMIN SERPL BCP-MCNC: 3.3 G/DL (ref 3.4–5)
ALP SERPL-CCNC: 112 U/L (ref 45–117)
ALT SERPL W P-5'-P-CCNC: 25 U/L (ref 12–78)
AST SERPL W P-5'-P-CCNC: 16 U/L (ref 15–37)
BUN BLD-MCNC: 12 MG/DL (ref 7–18)
GLUCOSE SERPLBLD-MCNC: 225 MG/DL (ref 74–106)
HCT VFR BLD CALC: 41.5 % (ref 36–45)
INR BLD: 0.98
LYMPHOCYTES # SPEC AUTO: 2.4 K/UL (ref 0.7–4.9)
MAGNESIUM SERPL-MCNC: 2.1 MG/DL (ref 1.8–2.4)
MCV RBC: 89.3 FL (ref 80–100)
NT-PROBNP SERPL-MCNC: 69 PG/ML (ref ?–125)
PMV BLD: 7.7 FL (ref 7.6–11.3)
POTASSIUM SERPL-SCNC: 3.2 MMOL/L (ref 3.5–5.1)
RBC # BLD: 4.65 M/UL (ref 3.86–4.86)
TROPONIN I SERPL HS-MCNC: 6.5 PG/ML (ref ?–58.9)

## 2022-08-30 PROCEDURE — 93005 ELECTROCARDIOGRAM TRACING: CPT

## 2022-08-30 PROCEDURE — 80076 HEPATIC FUNCTION PANEL: CPT

## 2022-08-30 PROCEDURE — 71275 CT ANGIOGRAPHY CHEST: CPT

## 2022-08-30 PROCEDURE — 85025 COMPLETE CBC W/AUTO DIFF WBC: CPT

## 2022-08-30 PROCEDURE — 94640 AIRWAY INHALATION TREATMENT: CPT

## 2022-08-30 PROCEDURE — 83605 ASSAY OF LACTIC ACID: CPT

## 2022-08-30 PROCEDURE — 85610 PROTHROMBIN TIME: CPT

## 2022-08-30 PROCEDURE — 99285 EMERGENCY DEPT VISIT HI MDM: CPT

## 2022-08-30 PROCEDURE — 87804 INFLUENZA ASSAY W/OPTIC: CPT

## 2022-08-30 PROCEDURE — 82947 ASSAY GLUCOSE BLOOD QUANT: CPT

## 2022-08-30 PROCEDURE — 96372 THER/PROPH/DIAG INJ SC/IM: CPT

## 2022-08-30 PROCEDURE — 83880 ASSAY OF NATRIURETIC PEPTIDE: CPT

## 2022-08-30 PROCEDURE — 87040 BLOOD CULTURE FOR BACTERIA: CPT

## 2022-08-30 PROCEDURE — 71045 X-RAY EXAM CHEST 1 VIEW: CPT

## 2022-08-30 PROCEDURE — 80048 BASIC METABOLIC PNL TOTAL CA: CPT

## 2022-08-30 PROCEDURE — 84484 ASSAY OF TROPONIN QUANT: CPT

## 2022-08-30 PROCEDURE — 94760 N-INVAS EAR/PLS OXIMETRY 1: CPT

## 2022-08-30 PROCEDURE — 87811 SARS-COV-2 COVID19 W/OPTIC: CPT

## 2022-08-30 PROCEDURE — 36415 COLL VENOUS BLD VENIPUNCTURE: CPT

## 2022-08-30 PROCEDURE — 83735 ASSAY OF MAGNESIUM: CPT

## 2022-08-30 RX ADMIN — HUMAN INSULIN SCH: 100 INJECTION, SOLUTION SUBCUTANEOUS at 07:30

## 2022-08-30 RX ADMIN — HUMAN INSULIN SCH UNIT: 100 INJECTION, SOLUTION SUBCUTANEOUS at 11:30

## 2022-08-30 NOTE — P.CNS
Date of Consult: 08/30/22


Reason for Consult: COPD exacerbation


Chief Complaint: COPD Exacerbation


History of Present Illness: 


Patient is 67 years of age heavy smoker diabetes admitted with worsening dyspnea

with up and over the past 10 days admitted with hypoxemia complaining of cough 

congestion being slightly better still hypoxic





Allergies





No Known Allergies Allergy (Verified 11/29/19 05:04)


   





Home Medications: 








Albuterol Sulfate [Proair Hfa] 2 puff IN Q4HR PRN 11/29/19 


Ipratropium/Albuterol Sulfate [Iprat-Albut 0.5-3(2.5) mg/3 ml] 3 ml NEB Q4HR PRN

11/29/19 


Metformin HCl [Glucophage*] 500 mg PO DAILY WITH BREAKFAST 11/29/19 


levoFLOXacin [Levaquin*] 1 tab PO DAILY 11/29/19 


predniSONE [Prednisone] 20 mg PO DAILY 11/29/19 


Blood Sugar Diagnostic [Test Strips] 1 each MC ACHS #100 strip 11/30/19 


Blood-Glucose Meter [Glucocard Shine Connex Meter] 1 each MC ACHS #1 each 

11/30/19 


Fluticasone/Salmeterol [Advair 250-50 Diskus] 1 each IH Q12HR #60 blst.w.dev 

11/30/19 


Lancets 1 each MC ACHS #100 each 11/30/19 








- Past Medical/Surgical History


Diabetic: Yes


-: Anxiety


-: Tobacco abuse


-: copd


-: DM


-: Hysterectomy


-: Tubal ligation


-: Ectopic pregnancy


Psychosocial/ Personal History: Patient is .  She has 3 children.  She 

does not work.





- Family History


  ** Father


Medical History: Hypertension





  ** Mother


Medical History: Other (see notes)


Notes: asthma diabetes cervical cancer





- Social History


Smoking Status: Current every day smoker


Alcohol use: No


CD- Drugs: No


Caffeine use: Yes


Place of Residence: Home





Review of Systems


10-point ROS is otherwise unremarkable


Respiratory: Cough, Shortness of Breath





Physical Examination


General: Alert, Oriented x3, Mild distress


Respiratory: Friction rub, Expiratory wheezes


Cardiovascular: Normal pulses


Gastrointestinal: Normal bowel sounds, Soft and benign


Musculoskeletal: No clubbing


Integumentary: No rashes


Laboratory Data (last 24 hrs)





08/30/22 03:00: PT 10.8, INR 0.98


08/30/22 03:00: WBC 13.60 H D, Hgb 13.8, Hct 41.5, Plt Count 364


08/30/22 03:00: Sodium 139, Potassium 3.2 L, BUN 12, Creatinine 0.84, Glucose 

225 H, Magnesium 2.1, Total Bilirubin 0.3, AST 16, ALT 25, Alkaline Phosphatase 

112








- Problems


(1) COPD exacerbation


Current Visit: Yes   Status: Acute   


Plan: 


Patient is 67 years of age admitted with COPD exacerbation heavy smoker 

chemistries reviewed white count is mildly elevated chest x-ray shows some 

interstitial changes no pulmonary embolism on CT scan change to p.o. prednisone 

inhalers possible discharge evaluate for home O2 if needed still not to smoke 

follow-up with me in 2-week

## 2022-08-30 NOTE — XMS REPORT
Continuity of Care Document

                           Created on:2022



Patient:KISHOR PEACOCK

Sex:Female

:1955

External Reference #:[id number]





Demographics







                          Address                   [facility] [facility] [address]



                                                    [place] [address]

 

                          Home Phone                [phone]

 

                          Email Address             DP

 

                          Preferred Language        Unknown

 

                          Marital Status            Unknown

 

                          Zoroastrianism Affiliation     Unknown

 

                          Race                      Unknown

 

                          Additional Race(s)        Unavailable

 

                          Ethnic Group              Unknown









Author







                          Organization              [facility]

t

 

                          Address                   [address] [place] [address] [address] [place] [address]

 

                          Phone                     [phone]









Care Team Providers







                    Name                Role                Phone

 

                    Unavailable         Unavailable         Unavailable









Problems

This patient has no known problems.



Allergies, Adverse Reactions, Alerts

This patient has no known allergies or adverse reactions.



Medications

This patient has no known medications.



Procedures

This patient has no known procedures.



Results

This patient has no known results.

## 2022-08-30 NOTE — RAD REPORT
EXAM DESCRIPTION:  CT - Chest For Pe Angio - 8/30/2022 7:59 am

 

CLINICAL HISTORY:  Shortness of breath

 

COMPARISON:  2019

 

TECHNIQUE:  Dynamically enhanced axial 3 mm thick images of the chest were obtained during administra
tion of <100> mL Isovue 370 IV contrast. Coronal and oblique reconstruction images were generated and
 reviewed. Exam utilizes a protocol for optimal evaluation of pulmonary arterial tree.

 

Maximum intensity projections 3D imaging was utilized

 

All CT scans are performed using dose optimization technique as appropriate and may include automated
 exposure control or mA/KV adjustment according to patient size.

 

FINDINGS:  A pulmonary embolus is not seen.

 

A thoracic aortic aneurysm is not noted.

 

A pleural effusion is not seen. A pericardial effusion is not seen.

 

Mild bilateral ground-glass opacities within the lungs

 

Thyroid nodules. Fatty liver

 

IMPRESSION:  Negative for a pulmonary embolism.

 

Mild bilateral ground-glass opacities within the lungs indicative of a mild alveolitis

 

Thyroid nodules. Nonemergent ultrasound recommended

## 2022-08-30 NOTE — RAD REPORT
EXAM DESCRIPTION:  RAD - Chest Single View - 8/30/2022 3:08 am

 

CLINICAL HISTORY:  The patient is 67 years old and is Female; Cough

 

TECHNIQUE:  Frontal view of the chest.

 

COMPARISON:  No prior images available for comparison at time of dictation

 

FINDINGS:  LUNGS:   Focal right hilar density favors benign granuloma.

        Lungs are well-aerated and clear bilaterally.

PLEURAL SPACE:   No pleural effusion or pneumothorax.

HEART:   Cardiac mediastinal silhouette is within normal limits. No mediastinal shift.

MEDIASTINUM:   See above.

BONES/JOINTS:   Unremarkable.

 

IMPRESSION:  No acute findings in the chest.

 

Electronically signed by:   Sahil Amanda MD   8/30/2022 3:25 AM CDT Workstation: [id number]

 

 

 

Due to temporary technical issues with the PACS/Fluency reporting system, reports are being signed by
 the in house radiologists without review as a courtesy to insure prompt reporting. The interpreting 
radiologist is fully responsible for the content of the report.

## 2022-08-30 NOTE — ER
Nurse's Notes                                                                                     

 [facility]                                                                 

Name: Cherelle Chang                                                                                

Age: 67 yrs                                                                                       

Sex: Female                                                                                       

: 1955                                                                                   

MRN: [id number]                                                                                   

Arrival Date: 2022                                                                          

Time: 22:51                                                                                       

Account#: [id number]                                                                            

Bed Waiting                                                                                       

Private MD:                                                                                       

Diagnosis:                                                                                        

                                                                                                  

ED Course:                                                                                        

                                                                                             

22:51 Patient arrived in ED.                                                                  bp1 

                                                                                                  

Administered Medications:                                                                         

No medications were administered                                                                  

                                                                                                  

                                                                                                  

Outcome:                                                                                          

23:20 Patient left the ED.                                                                    [id number] 

23:58 Patient left the ED.                                                                    ld1 

                                                                                                  

Signatures:                                                                                       

Sharri Mckinney                           bp1                                                  

Steph Simpson, RN                     RN   ld1                                                  

Jenny Key RN                          RN   eh3                                                  

                                                                                                  

**************************************************************************************************

## 2022-08-30 NOTE — XMS REPORT
Continuity of Care Document

                           Created on:2022



Patient:KISHOR PEACOCK

Sex:Female

:1955

External Reference #:[id number]





Demographics







                          Address                   [facility]



                                                    [place] [address]

 

                          Home Phone                [phone]

 

                          Email Address             DP

 

                          Preferred Language        Unknown

 

                          Marital Status            Unknown

 

                          Mu-ism Affiliation     Unknown

 

                          Race                      Unknown

 

                          Additional Race(s)        Unavailable

 

                          Ethnic Group              Unknown









Author







                          Organization              [facility]

t

 

                          Address                   [address] [place] [address] [address]. [address] [place] [id number]

 

                          Phone                     [phone]









Care Team Providers







                    Name                Role                Phone

 

                    Unavailable         Unavailable         Unavailable









Problems

This patient has no known problems.



Allergies, Adverse Reactions, Alerts

This patient has no known allergies or adverse reactions.



Medications

This patient has no known medications.



Procedures

This patient has no known procedures.



Results

This patient has no known results.

## 2022-08-30 NOTE — ER
Nurse's Notes                                                                                     

 [facility]                                                                 

Name: Cherelle Chang                                                                                

Age: 67 yrs                                                                                       

Sex: Female                                                                                       

: 1955                                                                                   

MRN: [id number]                                                                                   

Arrival Date: 2022                                                                          

Time: 02:33                                                                                       

Account#: [id number]                                                                            

Bed 7                                                                                             

Private MD:                                                                                       

Diagnosis: Hypoxemia;COPD/ Chronic obstructive pulmonary disease with (acute) exacerbation;Tobacco

  use;Tobacco abuse counseling;Type 2 diabetes mellitus with hyperglycemia;Elevated               

  white blood cell count;Hypokalemia                                                              

                                                                                                  

Presentation:                                                                                     

                                                                                             

02:35 Chief complaint: Patient states: "I don't know what's going on. I'm very short of       as6 

      breath. its been going on for about 10 days and it's getting worse. I don't have any        

      pain". Coronavirus screen: Client presents with at least one sign or symptom that may       

      indicate coronavirus-19. Ebola Screen: No symptoms or risks identified at this time.        

      Initial Sepsis Screen: Does the patient meet any 2 criteria? RR > 20 per min. HR > 90       

      bpm. Yes Does the patient have a suspected source of infection? Yes: Productive             

      cough/pneumonia. Risk Assessment: Do you want to hurt yourself or someone else? Patient     

      reports no desire to harm self or others. Onset of symptoms was 2022.            

02:35 Method Of Arrival: Ambulatory                                                           as6 

02:35 Acuity: SHON 2                                                                           as6 

                                                                                                  

Historical:                                                                                       

- Allergies:                                                                                      

02:40 NKA;                                                                                    as6 

- PMHx:                                                                                           

02:40 COPD; Diabetes - NIDDM;                                                                 as6 

                                                                                                  

- Immunization history:: Client reports having NOT received the Covid vaccine.                    

- Social history:: Smoking status: Patient reports the use of cigarette tobacco                   

  products, smokes one-half pack cigarettes per day.                                              

                                                                                                  

                                                                                                  

Screenin:00 Abuse screen: Denies threats or abuse. Nutritional screening: No deficits noted.        jb4 

      Tuberculosis screening: No symptoms or risk factors identified. Fall Risk None              

      identified.                                                                                 

                                                                                                  

Assessment:                                                                                       

03:13 General: Appears uncomfortable, well groomed, well developed, Behavior is calm,         kl  

      cooperative. Pain: Denies pain. Neuro: No deficits noted. Level of Consciousness is         

      awake, alert, obeys commands, Oriented to person, place, time, situation.                   

      Cardiovascular: Heart tones S1 S2 Rhythm is sinus rhythm. Respiratory: Airway is patent     

      Respiratory effort is labored, Breath sounds are diminished bilaterally. Breath sounds      

      with wheezes bilaterally. GI: No deficits noted. No signs and/or symptoms were reported     

      involving the gastrointestinal system. : No deficits noted. No signs and/or symptoms      

      were reported regarding the genitourinary system. EENT: No deficits noted. No signs         

      and/or symptoms were reported regarding the EENT system.                                    

                                                                                                  

Vital Signs:                                                                                      

02:35  / 90; Pulse 106; Resp 24 S; Temp 98.6(O); Pulse Ox 89% on R/A; Weight 64.41 kg   as6 

      (R); Height 5 ft. 3 in. (160.02 cm) (R); Pain 0/10;                                         

03:14  / 82; Pulse 96; Pulse Ox 90% on R/A;                                             kl  

02:35 Body Mass Index 25.15 (64.41 kg, 160.02 cm)                                             as6 

                                                                                                  

ED Course:                                                                                        

02:33 Patient arrived in ED.                                                                  bp1 

02:40 Triage completed.                                                                       as6 

02:41 Arm band placed on.                                                                     as6 

02:42 Benny Mendoza MD is Attending Physician.                                             samara 

03:00 Patient has correct armband on for positive identification. Client placed on continuous jb4 

      cardiac and pulse oximetry monitoring. NIBP monitoring applied. Cardiac monitor on.         

03:00 Inserted saline lock: 18 gauge in right forearm, using aseptic technique. Blood         jb4 

      collected.                                                                                  

03:08 Flo Corea, RN is Primary Nurse.                                                     jb4 

03:10 XRAY Chest (1 view) In Process Unspecified.                                             EDMS

03:15 Initial lab(s) drawn, by me, sent to lab. First set of blood cultures drawn by me.      jb4 

      Second set of blood cultures drawn by ED staff.                                             

03:41 Blood Culture Adult (2) Sent.                                                           jb4 

03:42 Lactate Sent.                                                                           jb4 

03:42 Troponin HS Sent.                                                                       jb4 

03:42 NT PRO-BNP Sent.                                                                        jb4 

03:42 Magnesium Sent.                                                                         jb4 

03:42 LFT's Sent.                                                                             jb4 

03:42 Basic Metabolic Panel Sent.                                                             jb4 

03:48 Dillon Rogers MD is Hospitalizing Provider.                                            samara 

04:00 No provider procedures requiring assistance completed. Patient admitted, IV remains in  jb4 

      place.                                                                                      

                                                                                                  

Administered Medications:                                                                         

03:39 Drug: NS 0.9% 1000 ml Route: IV; Rate: 1 bolus; Site: right forearm;                    jb4 

03:39 Drug: SOLU-Medrol (methylPrednisoLONE) 125 mg Route: IVP; Site: right forearm;          jb4 

03:39 Drug: Xopenex (levalbuterol) 3.75 mg Route: Inhalation;                                 jb4 

03:40 Drug: NS 0.9% 500 ml Route: IV; Rate: bolus; Site: right forearm;                       jb4 

03:41 Drug: Pepcid (famotidine) 20 mg Route: IVP; Site: right antecubital;                    jb4 

04:06 Drug: Rocephin (cefTRIAXone) 1 grams Route: IV; Rate: per protocol; Site: right forearm;jb4 

04:09 Drug: Zithromax (azithromycin) 500 mg Route: IVPB; Infused Over: 1 hrs; Site: right     jb4 

      forearm;                                                                                    

05:14 Drug: NS 0.9% 1000 ml Route: IV; Rate: 125 ml/hr; Site: right forearm;                  jb4 

05:14 Drug: Potassium Effervescent Tablet 25 mEq Route: PO;                                   jb4 

05:14 Drug: Lovenox (enoxaparin) 40 mg Route: Sub-Q; Site: left lower abdomen;                jb4 

05:33 Drug: Magnesium Sulfate 1 grams Route: IVPB; Infused Over: 1 hrs; Site: right forearm;  jb4 

                                                                                                  

                                                                                                  

Outcome:                                                                                          

03:50 Decision to Hospitalize by Provider.                                                    samara 

04:00 Admitted to ER Hold.  Please see [facility] for further documentation.                    jb4 

04:00 Condition: stable                                                                           

04:00 Discharge instructions given to patient, Instructed on the need for admit, Demonstrated     

      understanding of instructions.                                                              

13:33 Patient left the ED.                                                                    jl7 

                                                                                                  

Signatures:                                                                                       

Dispatcher [employer]                           EDMS                                                 

Alfreda Saunders RN RN kl Anderson, Corey, MD MD cha Bryson, James, RN RN   jb4                                                  

Della Galindo RN RN   jl7                                                  

Sharri Mckinney Ashby RN                      RN   as6                                                  

                                                                                                  

Corrections: (The following items were deleted from the chart)                                    

04:09 04:09 Rocephin (cefTRIAXone) 1 grams IV at per protocol in right forearm jb4            jb4 

                                                                                                  

**************************************************************************************************

## 2022-08-30 NOTE — EDPHYS
Physician Documentation                                                                           

 [facility]                                                                 

Name: Cherelle Chang                                                                                

Age: 67 yrs                                                                                       

Sex: Female                                                                                       

: 1955                                                                                   

MRN: [id number]                                                                                   

Arrival Date: 2022                                                                          

Time: 02:33                                                                                       

Account#: [id number]                                                                            

Bed 7                                                                                             

Private MD:                                                                                       

ED Physician Benny Mendoza                                                                      

HPI:                                                                                              

                                                                                             

02:53 This 67 yrs old  Female presents to ER via Ambulatory with complaints of        samara 

      Shortness Of Breath.                                                                        

02:53 The patient has shortness of breath at rest, with light activity. Onset: The            samara 

      symptoms/episode began/occurred 5 day(s) ago.                                               

                                                                                                  

Historical:                                                                                       

- Allergies:                                                                                      

02:40 NKA;                                                                                    as6 

- PMHx:                                                                                           

02:40 COPD; Diabetes - NIDDM;                                                                 as6 

                                                                                                  

- Immunization history:: Client reports having NOT received the Covid vaccine.                    

- Social history:: Smoking status: Patient reports the use of cigarette tobacco                   

  products, smokes one-half pack cigarettes per day.                                              

                                                                                                  

                                                                                                  

ROS:                                                                                              

03:44 Constitutional: Negative for fever, chills, and weight loss, Eyes: Negative for injury, samara 

      pain, redness, and discharge, ENT: Negative for injury, pain, and discharge, Neck:          

      Negative for injury, pain, and swelling, Cardiovascular: Negative for chest pain,           

      palpitations, and edema, Abdomen/GI: Negative for abdominal pain, nausea, vomiting,         

      diarrhea, and constipation, Back: Negative for injury and pain, : Negative for            

      injury, bleeding, discharge, and swelling, MS/Extremity: Negative for injury and            

      deformity, Skin: Negative for injury, rash, and discoloration, Neuro: Negative for          

      headache, weakness, numbness, tingling, and seizure, Psych: Negative for depression,        

      anxiety, suicide ideation, homicidal ideation, and hallucinations, Allergy/Immunology:      

      Negative for hives, rash, and allergies, Endocrine: Negative for neck swelling,             

      polydipsia, polyuria, polyphagia, and marked weight changes, Hematologic/Lymphatic:         

      Negative for swollen nodes, abnormal bleeding, and unusual bruising.                        

03:44 Respiratory: Positive for cough, shortness of breath, at rest. wheezing, inspiratory,       

      expiratory, of the left posterior upper lobe, right posterior upper lobe, left              

      posterior lower lobe, right posterior middle lobe and right posterior lower lobe.           

                                                                                                  

Exam:                                                                                             

03:44 Constitutional:  This is a well developed, well nourished patient who is awake, alert,  samara 

      and in no acute distress. Head/Face:  Normocephalic, atraumatic. Eyes:  Pupils equal        

      round and reactive to light, extra-ocular motions intact.  Lids and lashes normal.          

      Conjunctiva and sclera are non-icteric and not injected.  Cornea within normal limits.      

      Periorbital areas with no swelling, redness, or edema. ENT:  Nares patent. No nasal         

      discharge, no septal abnormalities noted.  Tympanic membranes are normal and external       

      auditory canals are clear.  Oropharynx with no redness, swelling, or masses, exudates,      

      or evidence of obstruction, uvula midline.  Mucous membranes moist. Neck:  Trachea          

      midline, no thyromegaly or masses palpated, and no cervical lymphadenopathy.  Supple,       

      full range of motion without nuchal rigidity, or vertebral point tenderness.  No            

      Meningismus. Chest/axilla:  Normal chest wall appearance and motion.  Nontender with no     

      deformity.  No lesions are appreciated. Cardiovascular:  Regular rate and rhythm with a     

      normal S1 and S2.  No gallops, murmurs, or rubs.  Normal PMI, no JVD.  No pulse             

      deficits. Abdomen/GI:  Soft, non-tender, with normal bowel sounds.  No distension or        

      tympany.  No guarding or rebound.  No evidence of tenderness throughout. Back:  No          

      spinal tenderness.  No costovertebral tenderness.  Full range of motion. Female :         

      Normal external genitalia. Skin:  Warm, dry with normal turgor.  Normal color with no       

      rashes, no lesions, and no evidence of cellulitis. MS/ Extremity:  Pulses equal, no         

      cyanosis.  Neurovascular intact.  Full, normal range of motion. Neuro:  Awake and           

      alert, GCS 15, oriented to person, place, time, and situation.  Cranial nerves II-XII       

      grossly intact.  Motor strength 5/5 in all extremities.  Sensory grossly intact.            

      Cerebellar exam normal.  Normal gait. Psych:  Awake, alert, with orientation to person,     

      place and time.  Behavior, mood, and affect are within normal limits.                       

03:44 ECG was reviewed by the Attending Physician.                                                

03:44 Respiratory: the patient does not display signs of respiratory distress,  Respirations:     

      no acute changes, is not noted, Breath sounds: bronchial sounds, that are moderate, are     

      scattered, decreased breath sounds, that are mild, are scattered, rhonchi, that are         

      mild, are scattered, stridor, is not appreciated, + upper airway congestion. wheezing:      

      inspiratory expiratory that is moderate, is heard diffusely.                                

                                                                                                  

Vital Signs:                                                                                      

02:35  / 90; Pulse 106; Resp 24 S; Temp 98.6(O); Pulse Ox 89% on R/A; Weight 64.41 kg   as6 

      (R); Height 5 ft. 3 in. (160.02 cm) (R); Pain 0/10;                                         

03:14  / 82; Pulse 96; Pulse Ox 90% on R/A;                                             kl  

02:35 Body Mass Index 25.15 (64.41 kg, 160.02 cm)                                             as6 

                                                                                                  

MDM:                                                                                              

02:42 Patient medically screened.                                                             samara 

03:47 Differential diagnosis: Anxiety Reaction asthma, Bronchitis Chronic Obstructive         samara 

      Pulmonary Disease pneumonia, pulmonary edema, Pulmonary Embolism reactive airway            

      disease, Unstable Angina. Antibiotic administration: Rocephin and Zithromax given. The      

      patient's Wells Deep Vein Thrombosis Score was calculated as follows: Total Score: 0-2      

      Pts- Low Risk. The patient's pulmonary embolism risk score was calculated as follows:       

      Total Score: 0-2 points. This patient was found to be at low risk for a pulmonary           

      embolism by using the Well's assessment criteria. Immunization status: Pneumococcal         

      vaccine: Influenza vaccine: Data reviewed: vital signs, nurses notes, EMS record, lab       

      test result(s), EKG, radiologic studies, plain films. Data interpreted: Cardiac             

      monitor: rate is 96 beats/min, rhythm is regular, Pulse oximetry: on room air is 90 %.      

      Test interpretation: by ED physician or midlevel provider: ECG, plain radiologic            

      studies. Counseling: I had a detailed discussion with the patient and/or guardian           

      regarding: the historical points, exam findings, and any diagnostic results supporting      

      the discharge/admit diagnosis, lab results, radiology results, the need for further         

      work-up and treatment in the hospital.                                                      

                                                                                                  

                                                                                             

02:53 Order name: Basic Metabolic Panel; Complete Time: 03:56                                 [facility] 

                                                                                             

02:53 Order name: CBC with Diff; Complete Time: 03:56                                         samara 

                                                                                             

02:53 Order name: LFT's; Complete Time: 03:56                                                 samara 

                                                                                             

02:53 Order name: Magnesium; Complete Time: 03:56                                             samara 

                                                                                             

02:53 Order name: NT PRO-BNP; Complete Time: 03:56                                            [facility] 

                                                                                             

02:53 Order name: PT-INR; Complete Time: 03:56                                                [facility] 

                                                                                             

02:53 Order name: Troponin HS; Complete Time: 03:56                                           [facility] 

                                                                                             

02:53 Order name: Blood Culture Adult (2)                                                     [facility] 

                                                                                             

02:53 Order name: Lactate; Complete Time: 03:56                                               [facility] 

                                                                                             

02:53 Order name: SARS RAPID                                                                  [facility] 

                                                                                             

02:53 Order name: Flu                                                                         [facility] 

                                                                                             

05:57 Order name: Lactate Sepsis 2 HR Follow-up                                               [facility]

                                                                                             

06:36 Order name: SARS-COV-2 Antigen Rapid                                                    [facility]

                                                                                             

08:19 Order name: Glucose, Ancillary Testing                                                  [facility]

                                                                                             

02:53 Order name: XRAY Chest (1 view)                                                         [facility] 

                                                                                             

02:53 Order name: EKG; Complete Time: 02:53                                                   [facility] 

                                                                                             

02:53 Order name: Cardiac monitoring; Complete Time: 03:41                                    [facility] 

                                                                                             

08:32 Order name: CT                                                                          [facility]

                                                                                             

11:58 Order name: Glucose, Ancillary Testing                                                  [facility]

                                                                                             

02:53 Order name: EKG - Nurse/Tech; Complete Time: 03:41                                      [facility] 

                                                                                             

02:53 Order name: IV Saline Lock; Complete Time: 03:41                                        [facility] 

                                                                                             

02:53 Order name: Labs collected and sent; Complete Time: 03:41                               [facility] 

                                                                                             

02:53 Order name: O2 Per Protocol; Complete Time: 03:41                                       [facility] 

                                                                                             

02:53 Order name: O2 Sat Monitoring; Complete Time: 03:41                                     [facility] 

                                                                                                  

EC:44 Rate is 99 beats/min. Rhythm is regular. QRS Axis is Normal. NV interval is normal. QRS samara 

      interval is normal. QT interval is normal. No Q waves. T waves are Normal. No ST            

      changes noted. Clinical impression: NSR w/ Non-specific ST/T Changes and No evidence of     

      ischemia. Interpreted by me. Reviewed by me.                                                

                                                                                                  

Administered Medications:                                                                         

03:39 Drug: NS 0.9% 1000 ml Route: IV; Rate: 1 bolus; Site: right forearm;                    jb4 

03:39 Drug: SOLU-Medrol (methylPrednisoLONE) 125 mg Route: IVP; Site: right forearm;          jb4 

03:39 Drug: Xopenex (levalbuterol) 3.75 mg Route: Inhalation;                                 jb4 

03:40 Drug: NS 0.9% 500 ml Route: IV; Rate: bolus; Site: right forearm;                       jb4 

03:41 Drug: Pepcid (famotidine) 20 mg Route: IVP; Site: right antecubital;                    jb4 

04:06 Drug: Rocephin (cefTRIAXone) 1 grams Route: IV; Rate: per protocol; Site: right forearm;jb4 

04:09 Drug: Zithromax (azithromycin) 500 mg Route: IVPB; Infused Over: 1 hrs; Site: right     jb4 

      forearm;                                                                                    

05:14 Drug: NS 0.9% 1000 ml Route: IV; Rate: 125 ml/hr; Site: right forearm;                  jb4 

05:14 Drug: Potassium Effervescent Tablet 25 mEq Route: PO;                                   jb4 

05:14 Drug: Lovenox (enoxaparin) 40 mg Route: Sub-Q; Site: left lower abdomen;                jb4 

05:33 Drug: Magnesium Sulfate 1 grams Route: IVPB; Infused Over: 1 hrs; Site: right forearm;  jb4 

                                                                                                  

                                                                                                  

Disposition Summary:                                                                              

22 03:50                                                                                    

Hospitalization Ordered                                                                           

      Hospitalization Status: Observation                                                     samara 

      Provider: Dillon Rogers cha 

      Condition: Stable                                                                       samara 

      Problem: new                                                                            samara 

      Symptoms: have improved                                                                 samara 

      Bed/Room Type: Standard                                                                 samara 

      Location: [facility] ER HOLD(22 04:39)                                                    

      Room Assignment: ERHOLD-(22 04:39)                                                  

      Diagnosis                                                                                   

        - Hypoxemia                                                                           samara 

        - COPD/ Chronic obstructive pulmonary disease with (acute) exacerbation               samara 

        - Tobacco use                                                                         samara 

        - Tobacco abuse counseling                                                            samara 

        - Type 2 diabetes mellitus with hyperglycemia                                         samara 

        - Elevated white blood cell count                                                     samara 

        - Hypokalemia                                                                         samara 

      Forms:                                                                                      

        - Medication Reconciliation Form                                                      samara 

        - SBAR form                                                                           samara 

Signatures:                                                                                       

Dispatcher [employer]                           EDMS                                                 

Nell Cabello RN RN mw Anderson, Corey, MD MD cha Bryson, James, RN                       RN   jb4                                                  

Abhilash Smyth RN                      RN   as6                                                  

                                                                                                  

Corrections: (The following items were deleted from the chart)                                    

04:39 03:50 Telemetry/MedSurg (observation) [facility]  

04:39 03:50 samara                                                                                 

                                                                                                  

**************************************************************************************************

## 2022-08-30 NOTE — EKG
Test Date:    2022-08-30               Test Time:    03:14:56

Technician:   GANESH                                     

                                                     

MEASUREMENT RESULTS:                                       

Intervals:                                           

Rate:         99                                     

OK:           156                                    

QRSD:         76                                     

QT:           360                                    

QTc:          462                                    

Axis:                                                

P:            78                                     

OK:           156                                    

QRS:          78                                     

T:            70                                     

                                                     

INTERPRETIVE STATEMENTS:                                       

                                                     

Normal sinus rhythm

Normal ECG

Compared to ECG 02/23/2020 10:46:22

No significant changes



Electronically Signed On 08-30-22 08:11:53 CDT by Nimesh Hall

## 2022-08-30 NOTE — P.DS
Admission Date: 08/30/22


Discharge Date: 08/30/22


Disposition: ROUTINE DISCHARGE


Discharge Condition: GOOD


Reason for Admission: COPD Exacerbation


Consultations: 





Pulmonology - Dr. Renteria





Brief History of Present Illness: 





67-year-old female with COPD, 1 ppd smoker, hypertension, and noninsulin-

dependent type 2 diabetes who presented to the ED with complaints of shortness 

of breath.  She states that she has been short of breath for about 10 days now 

but it has progressively gotten worse.  She is saturating 89% on room air upon 

arrival, tachypneic, and tachycardic.  Her labs are significant for WBC 13, 

lactic acid 2.6, potassium 3.2.  Chest x-ray negative.  She was given Solu-

Medrol, breathing treatments, azithromycin, Rocephin, magnesium.  Upon my 

assessment, patient reports that she is feeling better though she is short of 

breath during our conversation. Expiratory wheezes noted bilaterally. She is 

admitted for observation.








Hospital Course: 





Problem list


Acute on chronic COPD exacerbation


Diabetes mellitus type 2, non-insulin-dependent


Hyperlipidemia


Nicotine use disorder





Patient was found to be hypoxic, secondary to mild alveolitis / COPD 

Exacerbation.


Pulmonology was consulted and patient improved with steroids and 

nebulizers/inhalers.


Patient was weaned to room air and reported feeling much better.


She was deemed stable for discharge home. To continue her inhaler/nebulizer at 

home, and sent a prescription for a short course of prednisone.





Follow up with PCP within 1 week


Follow up with Dr. Renteria in ~2 weeks.





Counselled on smoking cessation. Patient will follow up with PCP to further 

review options to assist her in quitting.





No other changes to medications





CTA Chest (8/30): no pulmonary embolism. +mild bilateral ground glass opacities 

within the lungs indicative of mild alveolitis. 





Did note incidental thyroid nodules - recommended nonemergent ultrasound follow 

up in near future.





General: Alert, In no apparent distress, Oriented x3


HEENT: EOMI, Sclerae nonicteric


Respiratory: Clear to auscultation bilaterally, Normal air movement


Cardiovascular: No edema, Regular rate/rhythm


Gastrointestinal: Soft and benign, Non-distended, No tenderness


Integumentary: No rashes, No significant lesion


Neurological: Normal speech, Normal strength at 5/5 x4 extr, Normal affect


Laboratory Data at Discharge: 














WBC  13.60 K/uL (4.3-10.9)  H D 08/30/22  03:00    


 


Hgb  13.8 g/dL (12.0-15.0)   08/30/22  03:00    


 


Hct  41.5 % (36.0-45.0)   08/30/22  03:00    


 


Plt Count  364 K/uL (152-406)   08/30/22  03:00    


 


PT  10.8 SECONDS (9.5-12.5)   08/30/22  03:00    


 


INR  0.98   08/30/22  03:00    


 


Sodium  139 mmol/L (136-145)   08/30/22  03:00    


 


Potassium  3.2 mmol/L (3.5-5.1)  L  08/30/22  03:00    


 


BUN  12 mg/dL (7-18)   08/30/22  03:00    


 


Creatinine  0.84 mg/dL (0.55-1.3)   08/30/22  03:00    


 


Glucose  225 mg/dL ()  H  08/30/22  03:00    


 


Magnesium  2.1 mg/dL (1.8-2.4)   08/30/22  03:00    


 


Total Bilirubin  0.3 mg/dL (0.2-1.0)   08/30/22  03:00    


 


AST  16 U/L (15-37)   08/30/22  03:00    


 


ALT  25 U/L (12-78)   08/30/22  03:00    


 


Alkaline Phosphatase  112 U/L ()   08/30/22  03:00    








Home Medications: 








RX: Albuterol Sulfate [Proair Hfa] 2 puff IN Q4HR PRN 11/29/19 


RX: Ipratropium/Albuterol Sulfate [Iprat-Albut 0.5-3(2.5) mg/3 ml] 3 ml NEB Q4HR

PRN 11/29/19 


RX: Metformin HCl [Glucophage*] 500 mg PO DAILY WITH BREAKFAST 11/29/19 


RX: Blood Sugar Diagnostic [Test Strips] 1 each  ACHS #100 strip 11/30/19 


RX: Blood-Glucose Meter [Glucocard Shine Connex Meter] 1 each  ACHS #1 each 

11/30/19 


RX: Fluticasone/Salmeterol [Advair 250-50 Diskus] 1 each  Q12HR #60 blst.w.dev

11/30/19 


RX: Lancets 1 each  ACHS #100 each 11/30/19 


RX: Mometasone/Formoterol [Dulera 200 Mcg/5 Mcg Inhaler] 2 puff IH BID 30 Days 

#0 inhaler 08/30/22 


RX: predniSONE [Prednisone*] 20 mg PO SEECOM 5 Days #7 tab 08/30/22 





New Medications: 


RX: predniSONE [Prednisone*] 20 mg PO SEECOM 5 Days #7 tab


Physician Discharge Instructions: 


Patient was found to be hypoxic, secondary to mild alveolitis / COPD 

Exacerbation.


Pulmonology was consulted and patient improved with steroids and 

nebulizers/inhalers.


Patient was weaned to room air and reported feeling much better.


She was deemed stable for discharge home. To continue her inhaler/nebulizer at 

home, and sent a prescription for a short course of prednisone.





Follow up with PCP within 1 week


Follow up with Dr. Renteria in ~2 weeks.





Counselled on smoking cessation. Patient will follow up with PCP to further 

review options to assist her in quitting.





No other changes to medications





CTA Chest (8/30): no pulmonary embolism. +mild bilateral ground glass opacities 

within the lungs indicative of mild alveolitis. 





Did note incidental thyroid nodules - recommended nonemergent ultrasound follow 

up in near future.





Followup: 


Braden Estrada DO [Primary Care Provider] - 


Time spent managing pt's care (in minutes): 45

## 2022-08-30 NOTE — P.HP
Certification for Inpatient


Patient admitted to: Observation


With expected LOS: <2 Midnights


Patient will require the following post-hospital care: None


Practitioner: I am a practitioner with admitting privileges, knowledge of 

patient current condition, hospital course, and medical plan of care.


Services: Services provided to patient in accordance with Admission requirements

found in Title 42 Section 412.3 of the Code of Federal Regulations





Patient History


Date of Service: 08/30/22


Reason for admission: COPD Exacerbation


History of Present Illness: 


Patient is a 67-year-old female with COPD, 1 ppd smoker, hypertension, and 

noninsulin-dependent type 2 diabetes who presented to the ED with complaints of 

shortness of breath.  She states that she has been short of breath for about 10 

days now but it has progressively gotten worse.  She is saturating 89% on room 

air upon arrival, tachypneic, and tachycardic.  Her labs are significant for WBC

13, lactic acid 2.6, potassium 3.2.  Chest x-ray negative.  She was given Solu-

Medrol, breathing treatments, azithromycin, Rocephin, magnesium.  Upon my 

assessment, patient reports that she is feeling better though she is short of 

breath during our conversation. Expiratory wheezes noted bilaterally. She is 

admitted for observation.





Allergies





No Known Allergies Allergy (Verified 11/29/19 05:04)


   





Home medications list reviewed: Yes


Home Medications: 








Albuterol Sulfate [Proair Hfa] 2 puff IN Q4HR PRN 11/29/19 


Ipratropium/Albuterol Sulfate [Iprat-Albut 0.5-3(2.5) mg/3 ml] 3 ml NEB Q4HR PRN

11/29/19 


Metformin HCl [Glucophage*] 500 mg PO DAILY WITH BREAKFAST 11/29/19 


levoFLOXacin [Levaquin*] 1 tab PO DAILY 11/29/19 


predniSONE [Prednisone] 20 mg PO DAILY 11/29/19 


Blood Sugar Diagnostic [Test Strips] 1 each  ACHS #100 strip 11/30/19 


Blood-Glucose Meter [Glucocard Shine Connex Meter] 1 each  ACHS #1 each 11/30/ 19 


Fluticasone/Salmeterol [Advair 250-50 Diskus] 1 each IH Q12HR #60 blst.w.dev 

11/30/19 


Lancets 1 each  ACHS #100 each 11/30/19 








- Past Medical/Surgical History


Diabetic: Yes


-: Anxiety


-: Tobacco abuse


-: copd


-: DM


-: Hysterectomy


-: Tubal ligation


-: Ectopic pregnancy


Psychosocial/ Personal History: Patient is .  She has 3 children.  She 

does not work.





- Family History


  ** Father


-: Hypertension





  ** Mother


-: Other (see notes)


Notes: asthma diabetes cervical cancer





- Social History


Smoking Status: Current every day smoker


Alcohol use: No


CD- Drugs: No


Caffeine use: Yes


Place of Residence: Home





Review of Systems


Respiratory: Shortness of Breath





Physical Examination





- Physical Exam


General: Alert, In no apparent distress


HEENT: Atraumatic, PERRLA, EOMI, Sclerae nonicteric


Neck: Supple, 2+ carotid pulse no bruit, No LAD, Without JVD or thyroid 

abnormality


Respiratory: Expiratory wheezes, Other (tachypnea)


Cardiovascular: Normal S1 S2, Irregular heart rate/rhythm (tachycardic)


Gastrointestinal: Normal bowel sounds, No tenderness


Musculoskeletal: No tenderness


Integumentary: No rashes


Neurological: Normal gait, Normal speech, Normal strength at 5/5 x4 extr, Normal

tone, Normal affect





- Studies


Laboratory Data (last 24 hrs)





08/30/22 03:00: PT 10.8, INR 0.98


08/30/22 03:00: WBC 13.60 H D, Hgb 13.8, Hct 41.5, Plt Count 364


08/30/22 03:00: Sodium 139, Potassium 3.2 L, BUN 12, Creatinine 0.84, Glucose 

225 H, Magnesium 2.1, Total Bilirubin 0.3, AST 16, ALT 25, Alkaline Phosphatase 

112








Assessment and Plan





- Problems (Diagnosis)


(1) COPD exacerbation


Current Visit: Yes   Status: Acute   





(2) Type 2 diabetes mellitus


Current Visit: Yes   Status: Chronic   


Qualifiers: 


   Diabetes mellitus long term insulin use: with long term use   Diabetes 

mellitus complication status: with hyperglycemia   Qualified Code(s): E11.65 - 

Type 2 diabetes mellitus with hyperglycemia; Z79.4 - Long term (current) use of 

insulin   





(3) Hypokalemia


Current Visit: Yes   Status: Acute   





(4) Hyperlipidemia


Current Visit: Yes   Status: Chronic   


Qualifiers: 


   Hyperlipidemia type: unspecified   Qualified Code(s): E78.5 - Hyperlipidemia,

unspecified   





(5) Tobacco abuse


Current Visit: Yes   Status: Chronic   





- Plan


-O2 sat and RR have improved. Continue to monitor


-Scheduled breathing treatments


-Continue azithromycin and rocephin


-Pulmonology consult


-Patient has been hypertensive. Hydralazine ordered PRN. 


-Tobacco cessation counseling


-Monitor and replete electrolytes per protocol


-Reconcile and continue home medications


-Lovenox for VTE ppx


-Full code





Discharge Plan: Home


Plan to discharge in: 24 Hours





- Advance Directives


Does patient have a Living Will: No


Does patient have a Durable POA for Healthcare: No





- Code Status/Comfort Care


Code Status Assessed: Yes (Full)


Critical Care: No


Time Spent Managing Pts Care (In Minutes): 50